# Patient Record
Sex: FEMALE | Race: BLACK OR AFRICAN AMERICAN | Employment: STUDENT | URBAN - METROPOLITAN AREA
[De-identification: names, ages, dates, MRNs, and addresses within clinical notes are randomized per-mention and may not be internally consistent; named-entity substitution may affect disease eponyms.]

---

## 2017-03-24 ENCOUNTER — APPOINTMENT (OUTPATIENT)
Dept: ULTRASOUND IMAGING | Age: 22
End: 2017-03-24
Attending: EMERGENCY MEDICINE
Payer: MEDICAID

## 2017-03-24 ENCOUNTER — HOSPITAL ENCOUNTER (EMERGENCY)
Age: 22
Discharge: HOME | End: 2017-03-24
Attending: EMERGENCY MEDICINE
Payer: MEDICAID

## 2017-03-24 VITALS
TEMPERATURE: 98 F | WEIGHT: 150 LBS | DIASTOLIC BLOOD PRESSURE: 69 MMHG | HEART RATE: 80 BPM | BODY MASS INDEX: 25.61 KG/M2 | RESPIRATION RATE: 16 BRPM | HEIGHT: 64 IN | OXYGEN SATURATION: 99 % | SYSTOLIC BLOOD PRESSURE: 134 MMHG

## 2017-03-24 DIAGNOSIS — N93.8 DYSFUNCTIONAL UTERINE BLEEDING: Primary | ICD-10-CM

## 2017-03-24 LAB
BASOPHILS # BLD AUTO: 0.03 X10(3) UL (ref 0–0.1)
BASOPHILS NFR BLD AUTO: 0.5 %
EOSINOPHIL # BLD AUTO: 0.22 X10(3) UL (ref 0–0.3)
EOSINOPHIL NFR BLD AUTO: 3.5 %
ERYTHROCYTE [DISTWIDTH] IN BLOOD BY AUTOMATED COUNT: 13 % (ref 11.5–16)
HCT VFR BLD AUTO: 34.8 % (ref 34–50)
HGB BLD-MCNC: 12.2 G/DL (ref 12–16)
IMMATURE GRANULOCYTE COUNT: 0.02 X10(3) UL (ref 0–1)
IMMATURE GRANULOCYTE RATIO %: 0.3 %
LYMPHOCYTES # BLD AUTO: 2.05 X10(3) UL (ref 0.9–4)
LYMPHOCYTES NFR BLD AUTO: 32.3 %
MCH RBC QN AUTO: 31 PG (ref 27–33.2)
MCHC RBC AUTO-ENTMCNC: 35.1 G/DL (ref 31–37)
MCV RBC AUTO: 88.3 FL (ref 81–100)
MONOCYTES # BLD AUTO: 0.45 X10(3) UL (ref 0.1–0.6)
MONOCYTES NFR BLD AUTO: 7.1 %
NEUTROPHIL ABS PRELIM: 3.57 X10 (3) UL (ref 1.3–6.7)
NEUTROPHILS # BLD AUTO: 3.57 X10(3) UL (ref 1.3–6.7)
NEUTROPHILS NFR BLD AUTO: 56.3 %
PLATELET # BLD AUTO: 375 10(3)UL (ref 150–450)
RBC # BLD AUTO: 3.94 X10(6)UL (ref 3.8–5.1)
RED CELL DISTRIBUTION WIDTH-SD: 41.8 FL (ref 35.1–46.3)
WBC # BLD AUTO: 6.3 X10(3) UL (ref 4–13)

## 2017-03-24 PROCEDURE — 85025 COMPLETE CBC W/AUTO DIFF WBC: CPT | Performed by: EMERGENCY MEDICINE

## 2017-03-24 PROCEDURE — 76830 TRANSVAGINAL US NON-OB: CPT

## 2017-03-24 PROCEDURE — 99284 EMERGENCY DEPT VISIT MOD MDM: CPT

## 2017-03-24 PROCEDURE — 36415 COLL VENOUS BLD VENIPUNCTURE: CPT

## 2017-03-24 PROCEDURE — 76856 US EXAM PELVIC COMPLETE: CPT

## 2017-03-24 NOTE — ED INITIAL ASSESSMENT (HPI)
Pt c/o \"abnormal\" vaginal bleeding x 1 1/2 month. States OB/gyne told her to go to emergency department for evaluation. States she is \"going through 1 super tampon every hour\" currently. Denies dizziness, lightheadedness.    Sts she saw medical personal

## 2017-03-24 NOTE — ED PROVIDER NOTES
Patient Seen in: [facility] In [place]    History   Patient presents with:  Julissa-JUAN (gynecologic)    Stated Complaint: abnormal vaginal bleeding x1 month    HPI    20-year-old woman who attends [employer].   2 weeks ago she ha rhythm  Abdomen: Soft. There is moderate tenderness in the suprapubic and left lower quadrant. No abdominal masses. No peritoneal signs   Pelvic: No active bleeding.   Cervix is normal.  Extremities: no edema, normal peripheral pulses   Neuro: [place] where she usually resides.       Disposition and Plan     Clinical Impression:  Dysfunctional uterine bleeding  (primary encounter diagnosis)    Disposition:  Discharge    Follow-up:  MD Kimberly [address]

## 2017-03-27 ENCOUNTER — TELEPHONE (OUTPATIENT)
Dept: OBGYN CLINIC | Facility: CLINIC | Age: 22
End: 2017-03-27

## 2017-03-27 NOTE — TELEPHONE ENCOUNTER
PT has heavy bleeding for 2 wks/bleeding resolved/  PT will call back to schedule an appt for her one time visit

## 2017-08-01 ENCOUNTER — TELEPHONE (OUTPATIENT)
Dept: OBGYN CLINIC | Facility: CLINIC | Age: 22
End: 2017-08-01

## 2017-08-01 NOTE — TELEPHONE ENCOUNTER
Notified PT of No Show Appt  Notified PT of No Show policy  Notified PT that we have questions regarding current insurance

## (undated) NOTE — ED AVS SNAPSHOT
[facility] in [address] [place] [address]    Phone:  [phone]    Fax:  [phone]           Kanwal Yosvany   MRN: [id number]    Department:  [facility] in [place]   Date of Visit: from our patient liason soon after your visit. Also, some patients receive a detailed feedback survey mailed to them a week after the visit. If you receive this, we would really appreciate it if you could take the time to complete it. Thank you!       You [facility] [address] [facility] [address] ([address] [facility] [id number] ([address]) [facility] [address] [facility]. ([address]) [facility] UTERUS:  6.34 cm x 3.37 cm x 4.14 cm    Endometrium Thickness: 0.47 cm    The uterus appears normal in size, shape, and echogenicity. RIGHT OVARY:  3.08 cm x 2.13 cm x 2.53 cm    The right ovary appears normal in size, shape, and echogenicity.

## (undated) NOTE — ED AVS SNAPSHOT
[facility] in [address] [facility]    Phone:  [phone]    Fax:  [phone]           Cristina Forrest   MRN: [id number]    Department:  [facility] in [place]   Date of Visit: IF THERE IS ANY CHANGE OR WORSENING OF YOUR CONDITION, CALL YOUR PRIMARY CARE PHYSICIAN AT ONCE OR RETURN IMMEDIATELY TO THE EMERGENCY DEPARTMENT.     If you have been prescribed any medication(s), please fill your prescription right away and begin taking t